# Patient Record
Sex: FEMALE
[De-identification: names, ages, dates, MRNs, and addresses within clinical notes are randomized per-mention and may not be internally consistent; named-entity substitution may affect disease eponyms.]

---

## 2024-08-16 ENCOUNTER — NON-APPOINTMENT (OUTPATIENT)
Age: 59
End: 2024-08-16

## 2024-08-20 ENCOUNTER — APPOINTMENT (OUTPATIENT)
Dept: ENDOCRINOLOGY | Facility: CLINIC | Age: 59
End: 2024-08-20
Payer: COMMERCIAL

## 2024-08-20 VITALS
DIASTOLIC BLOOD PRESSURE: 81 MMHG | BODY MASS INDEX: 23.39 KG/M2 | HEART RATE: 56 BPM | HEIGHT: 67 IN | SYSTOLIC BLOOD PRESSURE: 120 MMHG | WEIGHT: 149 LBS

## 2024-08-20 DIAGNOSIS — E04.2 NONTOXIC MULTINODULAR GOITER: ICD-10-CM

## 2024-08-20 PROCEDURE — 99203 OFFICE O/P NEW LOW 30 MIN: CPT

## 2024-08-21 NOTE — HISTORY OF PRESENT ILLNESS
[FreeTextEntry1] : Thyroid nodule found around 2011 after mother was diagnosed with thyroid cancer. (Mother found to have incidental thyroid cancer during parathyroid surgery.  She eventually passed from lung cancer.) She had FNA in 2011, which was benign. Her last thyroid sono was 2020;  report reviewed -- R lower nodule 1.5 cm, additional sub-cm nodules labs reviewed from 7/24:  TSH 1.03 She is taking HRT rx'd by GYN.  Dose was reduced because she was getting some bleeding and breasts were increasing.  Meds estradiol patch 0.0375 mcg BIW progesterone 200mg hs rosuvastatin 5mg

## 2024-08-21 NOTE — ASSESSMENT
[FreeTextEntry1] : Thyroid nodules.  Euthyroid. Thyroid nodules typically grow slowly over time with the overwhelming majority of thyroid nodules (over 90%) being benign.  Will recommend repeat FNA biopsy if nodule increasing over 20% in size, or showing suspicious features (microcalcifications, irregular borders, tall > wide, hypervascularity). Otherwise, can continue monitoring TSH yearly and sonogram every 2-3 years (unless physical exam warrants sooner). RTO 1-2 years

## 2024-08-21 NOTE — PHYSICAL EXAM
[Alert] : alert [No Acute Distress] : no acute distress [No Proptosis] : no proptosis [No Lid Lag] : no lid lag [Normal Hearing] : hearing was normal [No LAD] : no lymphadenopathy [Clear to Auscultation] : lungs were clear to auscultation bilaterally [Normal S1, S2] : normal S1 and S2 [Regular Rhythm] : with a regular rhythm [Normal Affect] : the affect was normal [Normal Mood] : the mood was normal [Acanthosis Nigricans] : no acanthosis nigricans [de-identified] : thyroid palpable

## 2024-08-21 NOTE — DATA REVIEWED
[FreeTextEntry1] : 7/24  TSH 1.03, A1c 5.4%, tot chol 159, HDL 73, trig 128, LDL 65 3/24 tot chol 242, trig 179, HDL 73,  (pre statin)  thyroid sono, 1/2020 R lower nodule 15 x 8 x 11mm, spongiform, hyperechoic other nodules: 4mm, R lower; 6mm, L isthmus; 4mm, L mid

## 2024-08-21 NOTE — PHYSICAL EXAM
[Alert] : alert [No Acute Distress] : no acute distress [No Proptosis] : no proptosis [No Lid Lag] : no lid lag [Normal Hearing] : hearing was normal [No LAD] : no lymphadenopathy [Clear to Auscultation] : lungs were clear to auscultation bilaterally [Normal S1, S2] : normal S1 and S2 [Regular Rhythm] : with a regular rhythm [Normal Affect] : the affect was normal [Normal Mood] : the mood was normal [Acanthosis Nigricans] : no acanthosis nigricans [de-identified] : thyroid palpable